# Patient Record
Sex: OTHER/UNKNOWN | Race: BLACK OR AFRICAN AMERICAN | Employment: FULL TIME | URBAN - METROPOLITAN AREA
[De-identification: names, ages, dates, MRNs, and addresses within clinical notes are randomized per-mention and may not be internally consistent; named-entity substitution may affect disease eponyms.]

---

## 2020-06-06 ENCOUNTER — OFFICE VISIT (OUTPATIENT)
Dept: URGENT CARE | Facility: URGENT CARE | Age: 35
End: 2020-06-06
Payer: COMMERCIAL

## 2020-06-06 VITALS
TEMPERATURE: 98.3 F | DIASTOLIC BLOOD PRESSURE: 74 MMHG | HEART RATE: 81 BPM | OXYGEN SATURATION: 99 % | SYSTOLIC BLOOD PRESSURE: 110 MMHG

## 2020-06-06 DIAGNOSIS — R05.9 COUGH: Primary | ICD-10-CM

## 2020-06-06 DIAGNOSIS — R68.83 CHILLS: ICD-10-CM

## 2020-06-06 PROCEDURE — 99203 OFFICE O/P NEW LOW 30 MIN: CPT | Performed by: NURSE PRACTITIONER

## 2020-06-06 NOTE — PATIENT INSTRUCTIONS
Patient Education     Preventing the Spread of Infection: Understanding Isolation Procedures   Certain infections can spread from person to person. This is why your friend or family member may be put in a special room. Restrictions may be placed on who can go in and out of that room and what protection must be worn. This is for your protection and your loved one s protection. Read this sheet to learn more.   How infection spreads  Infection is caused by germs. An infected person carries germs that he or she can give to others. Even a person who doesn t feel sick can still carry and spread germs. Germs can cause infection by traveling through the air. They can also cause infection by direct contact or on the surface of objects such as a door handle, TV remote control, phone, bed railing, or tabletop. The rules about who can visit your friend or family member, and when they can visit, depend on what kind of infection he or she has.   Precautions help prevent the spread of infection  To stop infection from spreading, healthcare workers may do 1 or more of the following:    Place an infected person in a private room. Or in a room with others who have exactly the same infection. (This depends on what kind of infection the person has.)    Place restrictions on who can enter and exit this room.    Wear a mask and eye protection or a face shield, gloves, gown, or other items, and ask you to do the same when you visit.    Wear an air filter (respirator) for some infections, and ask you to do the same when you visit.  What you can do    You may be asked to wear a mask and eye protection or a face shield, gloves, gown, or other items when you visit. Follow any instructions carefully.    Practice good hand hygiene. This means washing your hands with soap and clean, warm or cold water for at least 20 seconds. Or using a 60% alcohol-based hand . This is especially important after you use the bathroom. And before and  after touching the person or his or her surroundings.    Keep your hands away from your face.    Cough or sneeze into a tissue. Then throw the tissue away and wash your hands. If you don't have a tissue, cough or sneeze into your elbow.    Don't use the person s bathroom.    Don't visit someone if you feel sick. Don't visit if you have been exposed to an illness such as the flu, chickenpox, or measles.    [employer] last reviewed this educational content on 6/1/2019 2000-2020 [employer] [address] [place] [address]. All rights reserved. This information is not intended as a substitute for professional medical care. Always follow your healthcare professional's instructions.

## 2020-06-06 NOTE — PROGRESS NOTES
SUBJECTIVE:   Leo Feliz is a 34 year old adult presenting with a chief complaint of cough.   Onset of symptoms was 2 day(s) ago.  Course of illness is same.    Severity moderate  Current and Associated symptoms: fever chills cough fatigue  Treatment measures tried include Tylenol/Ibuprofen.  Predisposing factors include None.  No sob wheezing chest pain    No past medical history on file.  No current outpatient medications on file.     Social History     Tobacco Use     Smoking status: Not on file   Substance Use Topics     Alcohol use: Not on file     Family history reviewed nothing pertinent to visit    ROS:  CONSTITUTIONAL:POSITIVE  for chills, fatigue and fever   INTEGUMENTARY/SKIN: NEGATIVE for worrisome rashes, moles or lesions  EYES: NEGATIVE for vision changes or irritation  ENT/MOUTH: NEGATIVE for ear, mouth and throat problems  RESP:POSITIVE for cough-non productive  CV: NEGATIVE for chest pain, palpitations or peripheral edema  GI: NEGATIVE for nausea, abdominal pain, heartburn, or change in bowel habits  : negative for dysuria, hematuria, decreased urinary stream, erectile dysfunction  MUSCULOSKELETAL: NEGATIVE for significant arthralgias or myalgia  NEURO: NEGATIVE for weakness, dizziness or paresthesias  ENDOCRINE: NEGATIVE for temperature intolerance, skin/hair changes  HEME/ALLERGY/IMMUNE: NEGATIVE for bleeding problems  PSYCHIATRIC: NEGATIVE for changes in mood or affect    OBJECTIVE:  /74   Pulse 81   Temp 98.3  F (36.8  C) (Tympanic)   SpO2 99%   GENERAL APPEARANCE:  alert and no distress  EYES: EOMI,  PERRL, conjunctiva clear  HENT: ear canals and TM's normal.  Nose and mouth without ulcers, erythema or lesions  NECK: supple, nontender, no lymphadenopathy  RESP: lungs clear to auscultation - no rales, rhonchi or wheezes  CV: regular rates and rhythm, normal S1 S2, no murmur noted  ABDOMEN:  soft, nontender, no HSM or masses and bowel sounds normal  NEURO: Normal strength and  tone, sensory exam grossly normal,  normal speech and mentation  SKIN: no suspicious lesions or rashes  LYMPHATICS: no cervical adenopathy    ASSESSMENT:  (R05) Cough  (primary encounter diagnosis)  (R68.83) Chills    Plan: Symptomatic COVID-19 Virus (Coronavirus) by PCR (await results will notify)  Tylenol for fever aches chills, fluids rest  Discussed self isolation 10 days and patient education given regarding course of illness and contagiousness  Any emergent symptoms to ER    PATRIC Burns CNP